# Patient Record
Sex: FEMALE | Race: WHITE | NOT HISPANIC OR LATINO
[De-identification: names, ages, dates, MRNs, and addresses within clinical notes are randomized per-mention and may not be internally consistent; named-entity substitution may affect disease eponyms.]

---

## 2018-10-17 ENCOUNTER — RESULT REVIEW (OUTPATIENT)
Age: 43
End: 2018-10-17

## 2019-01-11 ENCOUNTER — RESULT REVIEW (OUTPATIENT)
Age: 44
End: 2019-01-11

## 2019-10-30 ENCOUNTER — RESULT REVIEW (OUTPATIENT)
Age: 44
End: 2019-10-30

## 2021-01-13 ENCOUNTER — RESULT REVIEW (OUTPATIENT)
Age: 46
End: 2021-01-13

## 2022-07-27 ENCOUNTER — APPOINTMENT (OUTPATIENT)
Dept: NEUROSURGERY | Facility: CLINIC | Age: 47
End: 2022-07-27

## 2022-07-27 PROCEDURE — 99202 OFFICE O/P NEW SF 15 MIN: CPT

## 2022-07-27 NOTE — ASSESSMENT
[FreeTextEntry1] : Plan: \par \par - given absence of symptoms x 38 years, no neurosurgical intervention indicated at this juncture\par - return to clinic or ER with signs / symptoms of shunt failure, about which the patient was educated extensively\par \par Patient seen and discussed with Dr. Leger\par Spent 45 minutes with patient

## 2024-11-07 ENCOUNTER — LABORATORY RESULT (OUTPATIENT)
Age: 49
End: 2024-11-07